# Patient Record
Sex: MALE | Race: ASIAN | NOT HISPANIC OR LATINO
[De-identification: names, ages, dates, MRNs, and addresses within clinical notes are randomized per-mention and may not be internally consistent; named-entity substitution may affect disease eponyms.]

---
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
## 2022-10-27 NOTE — ED PROVIDER NOTE - PRO INTERPRETER NEED 2
[FreeTextEntry1] : Conrad Thornton is a 81 year old M with a history of chronic lower back pain in the setting of moderate to severe spinal stenosis, and Parkinson's disease diagnosed in 2012\par Having blood pressure fluctuations, orthostatic hypotension- follows with GI and nephrology\par Drooling -better with Claritin,, cannot use atropine drops per cardiology\par Constipation\par Soft speech\par Vivid dreams\par Azilect worsened dizziness stopped\par Freezing of gait\par Cervical dystonia-response to Botox\par \par Impression: Parkinsonism- autonomic dysfunction (fluctuating blood pressure, cold) REM behavior disorder and constipation are also present. \par \par Recommendations:\par -Patient was injected with 200 units of Botox last month.  He reports improvement in symptoms but still has residual neck pain and drooling.  At the next visit we will consider 300 units of Botox\par -Continue Sinemet 2 tablets 4 times a day\par -Continue PT OT.  \par -Increase melatonin from 3 mg to 5 mg at bedtime\par \par All questions were addressed and answered\par 
English

## 2024-03-11 NOTE — ED ADULT NURSE NOTE - SUICIDE SCREENING QUESTION 1
Approved today  Request Reference Number: [id number]. AJOVY /1.5 is approved through 03/11/2025. Your patient may now fill this prescription and it will be covered.   Yes

## 2024-11-18 NOTE — ED PROVIDER NOTE - PSH
No significant past surgical history no hematuria/no renal colic/no flank pain L/no incontinence/no dysuria

## 2025-05-15 NOTE — ED BEHAVIORAL HEALTH NOTE - BEHAVIORAL HEALTH NOTE
===================  PRE-HOSPITAL COURSE  ===================  SOURCE:  RN and triage documentation.   DETAILS:  Patient was brought in by family; chief complaint of panic attack/SI.     ============  ED COURSE   ============  SOURCE:  RN and triage documentation.   ARRIVAL:  Patient was cooperative with triage process and allowed for gowning/wanding without incident. Patient presents with good hygiene/dress for the weather  BELONGINGS:  Nothing notable; items stowed with security.   BEHAVIOR: Patient has been calm while in ED and has been interacting appropriately with ED staff. Patient provided blood/urine without incident. Patient presents with flat affect and doesn't make much eye contact. Patient did endorse worsening anxiety/depression, as well as SI with plan to crash his father's car. Patient presently denying HI/AH/VH or other psychiatric symptoms. Patient's speech is of normal volume/rate accompanied by a logical thought process, patient is AOx4. Patient has been resting in hospital bed awaiting consult.   TREATMENT: Patient has not required medication intervention at this time.   VISITORS: Patient is unaccompanied by social supports while in ED.     ========================  FOR EACH COLLATERAL  ========================  NAME:  NUMBER:   RELATIONSHIP:   RELIABILITY:   COMMENTS:     ========================  PATIENT DEMOGRAPHICS:  ========================  HPI  BASELINE FUNCTIONING:   DATE HPI STARTED:   DECOMPENSATION:   SUICIDALITY  VIOLENCE:    SUBSTANCE:        ========================  PAST PSYCHIATRIC HISTORY  ========================  DATE PAST PSYCHIATRIC HISTORY STARTED:  MAIN PSYCHIATRIC DIAGNOSIS:  PSYCHIATRIC HOSPITALIZATIONS:    PRIOR ILLNESS:?  SUICIDALITY:    VIOLENCE:    SUBSTANCE USE:      ==============  OTHER HISTORY  ==============  CURRENT MEDICATION:    MEDICAL HISTORY:    ALLERGIES  LEGAL ISSUES:  FIREARM ACCESS:  SOCIAL HISTORY:  FAMILY HISTORY:  DEVELOPMENTAL HISTORY: ===================  PRE-HOSPITAL COURSE  ===================  SOURCE:  RN and triage documentation.   DETAILS:  Patient was brought in by family; chief complaint of panic attack/SI.     ============  ED COURSE   ============  SOURCE:  RN and triage documentation.   ARRIVAL:  Patient was cooperative with triage process and allowed for gowning/wanding without incident. Patient presents with good hygiene/dress for the weather  BELONGINGS:  Nothing notable; items stowed with security.   BEHAVIOR: Patient has been calm while in ED and has been interacting appropriately with ED staff. Patient provided blood/urine without incident. Patient presents with flat affect and doesn't make much eye contact. Patient did endorse worsening anxiety/depression, as well as SI with plan to crash his father's car. Patient presently denying HI/AH/VH or other psychiatric symptoms. Patient's speech is of normal volume/rate accompanied by a logical thought process, patient is AOx4. Patient has been resting in hospital bed awaiting consult.   TREATMENT: Patient has not required medication intervention at this time.   VISITORS: Patient is unaccompanied by social supports while in ED.     ========================  FOR EACH COLLATERAL  ========================  NAME: Dominique  NUMBER: [phone].  RELATIONSHIP: Sister/Mother  RELIABILITY: Both reliable, spoke to patient today, lives with mother and spoke to sister.   COMMENTS: No immediate safety concerns for patient to return home at this time.     ========================  PATIENT DEMOGRAPHICS: Patient is a 20 y/o  male domiciled in a private home with mother/father, recently graduated high school, employed part time with [employer], in a relationship.    ========================  HPI  BASELINE FUNCTIONING: Patient at baseline is able to attend to ADL's without incident and reports normal eating/sleeping patterns. Collateral denies patient ever speaking to therapist/psychiatrist however collateral states parents wanted to take family to counseling since patient doesn't speak much with parents which has been concerning. Collateral endorses patient has just finished high school at [employer] and was receiving good grades; on track to attend [employer] in the fall for [employer] track. 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   DATE HPI STARTED: Past few weeks, escalated today.   DECOMPENSATION: Collateral unable to endorse a triggering event however states patient has seemed more down/depressed past few weeks, not eating all day, going to bed late and sleeping in very late. Collateral endorses patient has not been vocal about feelings and has denied depressive feelings when parent would ask him. Patient's mother called sister at 7:30pm describing what sounded like a panic attack, asked parents to come home. When parents arrived home, patient was extremely tearful and was inconsolable. Told parents he was having SI and wanted to go to hospital to be evaluated. This was the first episode of this occurring with patient; family is concerned due to sister's mental health history.   SUICIDALITY: Collateral denies SI/SIB/SA.   VIOLENCE:  Collateral denies HI/AH/VH or violence.   SUBSTANCE:  Collateral denies.       ========================  PAST PSYCHIATRIC HISTORY  ========================  DATE PAST PSYCHIATRIC HISTORY STARTED: Collateral denies.   MAIN PSYCHIATRIC DIAGNOSIS: Collateral denies.   PSYCHIATRIC HOSPITALIZATIONS:  Collateral denies.   PRIOR ILLNESS: Collateral denies.   SUICIDALITY:  Collateral denies.   VIOLENCE: Collateral denies.   SUBSTANCE USE: Collateral denies.      ==============  OTHER HISTORY  ==============  CURRENT MEDICATION: Collateral denies.   MEDICAL HISTORY:  Collateral denies.   ALLERGIES: Collateral denies.   LEGAL ISSUES: Collateral denies.   FIREARM ACCESS: Collateral denies.   SOCIAL HISTORY: Collateral endorses change in relationship could be triggering, unsure. Collateral endorses patient has been sneaking out to see girlfriend since her family hasn't been letting her out due to COVID.   FAMILY HISTORY: Sister endorses self; prior SA/Depression.   DEVELOPMENTAL HISTORY: Collateral denies. mother with anemia